# Patient Record
Sex: FEMALE
[De-identification: names, ages, dates, MRNs, and addresses within clinical notes are randomized per-mention and may not be internally consistent; named-entity substitution may affect disease eponyms.]

---

## 2020-05-29 ENCOUNTER — RX ONLY (RX ONLY)
Age: 60
End: 2020-05-29

## 2020-05-29 ENCOUNTER — OTHER (OUTPATIENT)
Dept: URBAN - METROPOLITAN AREA CLINIC 19 | Facility: CLINIC | Age: 60
Setting detail: DERMATOLOGY
End: 2020-05-29

## 2020-05-29 DIAGNOSIS — L82.1 OTHER SEBORRHEIC KERATOSIS: ICD-10-CM

## 2020-05-29 DIAGNOSIS — Z85.828 PERSONAL HISTORY OF OTHER MALIGNANT NEOPLASM OF SKIN: ICD-10-CM

## 2020-05-29 PROBLEM — L21.8 OTHER SEBORRHEIC DERMATITIS: Status: RESOLVED | Noted: 2020-05-29

## 2020-05-29 PROCEDURE — 11102 TANGNTL BX SKIN SINGLE LES: CPT

## 2020-05-29 PROCEDURE — 99213 OFFICE O/P EST LOW 20 MIN: CPT

## 2020-05-29 RX ORDER — CLOBETASOL PROPIONATE 0.5 MG/ML
A SMALL AMOUNT SOLUTION TOPICAL ONCE A DAY
Qty: 50 | Refills: 2
Start: 2020-05-29

## 2020-05-29 RX ORDER — KETOCONAZOLE 20 MG/ML
A SMALL AMOUNT SHAMPOO, SUSPENSION TOPICAL ONCE A DAY
Qty: 120 | Refills: 2
Start: 2020-05-29

## 2020-06-26 ENCOUNTER — FOLLOW-UP (OUTPATIENT)
Dept: URBAN - METROPOLITAN AREA CLINIC 19 | Facility: CLINIC | Age: 60
Setting detail: DERMATOLOGY
End: 2020-06-26

## 2020-06-26 PROBLEM — L21.8 OTHER SEBORRHEIC DERMATITIS: Status: RESOLVED | Noted: 2020-06-26

## 2020-06-26 PROCEDURE — 99212 OFFICE O/P EST SF 10 MIN: CPT

## 2024-11-05 ENCOUNTER — APPOINTMENT (OUTPATIENT)
Dept: URBAN - METROPOLITAN AREA SURGERY 12 | Age: 64
Setting detail: DERMATOLOGY
End: 2024-11-05

## 2024-11-05 DIAGNOSIS — L82.1 OTHER SEBORRHEIC KERATOSIS: ICD-10-CM

## 2024-11-05 DIAGNOSIS — L21.8 OTHER SEBORRHEIC DERMATITIS: ICD-10-CM

## 2024-11-05 PROCEDURE — OTHER MIPS QUALITY: OTHER

## 2024-11-05 PROCEDURE — OTHER PRESCRIPTION: OTHER

## 2024-11-05 PROCEDURE — OTHER COUNSELING: OTHER

## 2024-11-05 PROCEDURE — 99204 OFFICE O/P NEW MOD 45 MIN: CPT

## 2024-11-05 PROCEDURE — OTHER PRESCRIPTION MEDICATION MANAGEMENT: OTHER

## 2024-11-05 RX ORDER — TRIAMCINOLONE ACETONIDE 1 MG/G
CREAM TOPICAL
Qty: 80 | Refills: 0 | Status: ERX | COMMUNITY
Start: 2024-11-05

## 2024-11-05 RX ORDER — CLOBETASOL PROPIONATE 0.5 MG/ML
SOLUTION TOPICAL BID
Qty: 50 | Refills: 3 | Status: ERX | COMMUNITY
Start: 2024-11-05

## 2024-11-05 ASSESSMENT — LOCATION SIMPLE DESCRIPTION DERM: LOCATION SIMPLE: LEFT SHOULDER

## 2024-11-05 ASSESSMENT — LOCATION DETAILED DESCRIPTION DERM: LOCATION DETAILED: LEFT POSTERIOR SHOULDER

## 2024-11-05 ASSESSMENT — LOCATION ZONE DERM: LOCATION ZONE: ARM

## 2024-11-05 NOTE — HPI: RASH
What Type Of Note Output Would You Prefer (Optional)?: Bullet Format
How Severe Is Your Rash?: moderate
Is This A New Presentation, Or A Follow-Up?: Rash
Additional History: She used her brother in law’s clobetasol which was very helpful

## 2024-11-05 NOTE — PROCEDURE: PRESCRIPTION MEDICATION MANAGEMENT
Detail Level: Zone
Continue Regimen: Rf clobetasol soln bid prn scalp x 2 wks
Render In Strict Bullet Format?: No
Initiate Treatment: Begin triamcinolone cream bid to as for up to 2 weeks aa scalp and ears
Plan: Recommend selsun blue or head and shoulders to help with maintenance. Given list of otc shampoo options as she declines rx shampoo today

## 2025-04-11 ENCOUNTER — RX ONLY (RX ONLY)
Age: 65
End: 2025-04-11

## 2025-04-11 RX ORDER — TRIAMCINOLONE ACETONIDE 1 MG/G
CREAM TOPICAL
Qty: 30 | Refills: 1 | Status: ERX